# Patient Record
Sex: MALE
[De-identification: names, ages, dates, MRNs, and addresses within clinical notes are randomized per-mention and may not be internally consistent; named-entity substitution may affect disease eponyms.]

---

## 2020-07-02 ENCOUNTER — NURSE TRIAGE (OUTPATIENT)
Dept: OTHER | Facility: CLINIC | Age: 35
End: 2020-07-02

## 2020-07-02 NOTE — TELEPHONE ENCOUNTER
Reason for Disposition   [1] MODERATE dizziness (e.g., vertigo; feels very unsteady, interferes with normal activities) AND [2] has NOT been evaluated by physician for this    Answer Assessment - Initial Assessment Questions  1. DESCRIPTION: \"Describe your dizziness. \"      \"feels like I'm on a boat while standing\". 2. VERTIGO: \"Do you feel like either you or the room is spinning or tilting? \"      Tilting while standing. 3. LIGHTHEADED: \"Do you feel lightheaded? \" (e.g., somewhat faint, woozy, weak upon standing)      Denies. 4. SEVERITY: \"How bad is it? \"  \"Can you walk? \"    - MILD - Feels unsteady but walking normally. - MODERATE - Feels very unsteady when walking, but not falling; interferes with normal activities (e.g., school, work) . - SEVERE - Unable to walk without falling (requires assistance). Mild. 5. ONSET:  \"When did the dizziness begin? \"     Begain last night. 6. AGGRAVATING FACTORS: \"Does anything make it worse? \" (e.g., standing, change in head position)    Standing makes it worse, lying down makes it better. 7. CAUSE: \"What do you think is causing the dizziness? \"   Unsure. 8. RECURRENT SYMPTOM: \"Have you had dizziness before? \" If so, ask: \"When was the last time? \" \"What happened that time? \"     Denies. 9. OTHER SYMPTOMS: \"Do you have any other symptoms? \" (e.g., headache, weakness, numbness, vomiting, earache)     Congestion in sinus and ears. 10. PREGNANCY: \"Is there any chance you are pregnant? \" \"When was your N/A    Protocols used: DIZZINESS - VERTIGO-ADULT-